# Patient Record
Sex: FEMALE
[De-identification: names, ages, dates, MRNs, and addresses within clinical notes are randomized per-mention and may not be internally consistent; named-entity substitution may affect disease eponyms.]

---

## 2022-06-11 ENCOUNTER — HOSPITAL ENCOUNTER (EMERGENCY)
Dept: HOSPITAL 75 - ER FS | Age: 67
Discharge: HOME | End: 2022-06-11
Payer: COMMERCIAL

## 2022-06-11 VITALS — BODY MASS INDEX: 27.78 KG/M2 | HEIGHT: 66.02 IN | WEIGHT: 172.84 LBS

## 2022-06-11 VITALS — DIASTOLIC BLOOD PRESSURE: 63 MMHG | SYSTOLIC BLOOD PRESSURE: 134 MMHG

## 2022-06-11 DIAGNOSIS — R00.2: Primary | ICD-10-CM

## 2022-06-11 DIAGNOSIS — I45.10: ICD-10-CM

## 2022-06-11 DIAGNOSIS — E86.0: ICD-10-CM

## 2022-06-11 DIAGNOSIS — T48.4X5A: ICD-10-CM

## 2022-06-11 LAB
ALBUMIN SERPL-MCNC: 4.4 GM/DL (ref 3.2–4.5)
ALP SERPL-CCNC: 50 U/L (ref 40–136)
ALT SERPL-CCNC: 21 U/L (ref 0–55)
BASOPHILS # BLD AUTO: 0 10^3/UL (ref 0–0.1)
BASOPHILS NFR BLD AUTO: 0 % (ref 0–10)
BILIRUB SERPL-MCNC: 0.3 MG/DL (ref 0.1–1)
BUN/CREAT SERPL: 18
CALCIUM SERPL-MCNC: 9 MG/DL (ref 8.5–10.1)
CHLORIDE SERPL-SCNC: 97 MMOL/L (ref 98–107)
CO2 SERPL-SCNC: 27 MMOL/L (ref 21–32)
CREAT SERPL-MCNC: 0.5 MG/DL (ref 0.6–1.3)
EOSINOPHIL # BLD AUTO: 0.3 10^3/UL (ref 0–0.3)
EOSINOPHIL NFR BLD AUTO: 3 % (ref 0–10)
GFR SERPLBLD BASED ON 1.73 SQ M-ARVRAT: 102 ML/MIN
GLUCOSE SERPL-MCNC: 197 MG/DL (ref 70–105)
HCT VFR BLD CALC: 37 % (ref 35–52)
HGB BLD-MCNC: 12.4 G/DL (ref 11.5–16)
LYMPHOCYTES # BLD AUTO: 2 10^3/UL (ref 1–4)
LYMPHOCYTES NFR BLD AUTO: 26 % (ref 12–44)
MAGNESIUM SERPL-MCNC: 1.6 MG/DL (ref 1.6–2.4)
MANUAL DIFFERENTIAL PERFORMED BLD QL: NO
MCH RBC QN AUTO: 29 PG (ref 25–34)
MCHC RBC AUTO-ENTMCNC: 33 G/DL (ref 32–36)
MCV RBC AUTO: 88 FL (ref 80–99)
MONOCYTES # BLD AUTO: 0.8 10^3/UL (ref 0–1)
MONOCYTES NFR BLD AUTO: 11 % (ref 0–12)
NEUTROPHILS # BLD AUTO: 4.4 10^3/UL (ref 1.8–7.8)
NEUTROPHILS NFR BLD AUTO: 59 % (ref 42–75)
PLATELET # BLD: 275 10^3/UL (ref 130–400)
PMV BLD AUTO: 9.4 FL (ref 9–12.2)
POTASSIUM SERPL-SCNC: 3.6 MMOL/L (ref 3.6–5)
PROT SERPL-MCNC: 7 GM/DL (ref 6.4–8.2)
SODIUM SERPL-SCNC: 137 MMOL/L (ref 135–145)
WBC # BLD AUTO: 7.4 10^3/UL (ref 4.3–11)

## 2022-06-11 PROCEDURE — 85025 COMPLETE CBC W/AUTO DIFF WBC: CPT

## 2022-06-11 PROCEDURE — 93041 RHYTHM ECG TRACING: CPT

## 2022-06-11 PROCEDURE — 83735 ASSAY OF MAGNESIUM: CPT

## 2022-06-11 PROCEDURE — 36415 COLL VENOUS BLD VENIPUNCTURE: CPT

## 2022-06-11 PROCEDURE — 83880 ASSAY OF NATRIURETIC PEPTIDE: CPT

## 2022-06-11 PROCEDURE — 93005 ELECTROCARDIOGRAM TRACING: CPT

## 2022-06-11 PROCEDURE — 84484 ASSAY OF TROPONIN QUANT: CPT

## 2022-06-11 PROCEDURE — 80053 COMPREHEN METABOLIC PANEL: CPT

## 2022-06-11 NOTE — ED GENERAL
General


Stated Complaint:  POSS MED REACTION


Source of Information:  Patient





History of Present Illness


Date Seen by Provider:  Jun 11, 2022


Time Seen by Provider:  19:44


Initial Comments


67-year-old female presenting with complaints of feeling flushed and having 

heart palpitations after taking a Mucinex DM.  She has taken the medicine before

without having side effects.  She has had cough and cold symptoms for over a 

week.  She did do home COVID test that was negative.  She feels like her 

symptoms are improving since she took Mucinex DM but was concerned that 

something was wrong so she came to the emergency department.  She denies any 

rash, itching, nausea, vomiting, difficulty swallowing.


Timing/Duration:  1-3 Hours


Severity:  Moderate


Modifying Factors:  worse with Medication (Taking the Mucinex DM seem to trigger

her symptoms)


Associated Systoms:  No Chest Pain; Cough; No Diaphoresis, No Fever/Chills, No 

Headaches, No Loss of Appetite, No Malaise, No Nausea/Vomiting, No Rash, No 

Seizure, No Shortness of Air, No Syncope, No Weakness





Allergies and Home Medications


Allergies


Coded Allergies:  


     aspirin (Unverified  Adverse Reaction, Unknown, 6/11/22)


     codeine (Unverified  Adverse Reaction, Unknown, 6/11/22)





Patient Home Medication List


Home Medication List Reviewed:  Yes





Review of Systems


Review of Systems


Constitutional:  No chills, No fever


EENTM:  nose congestion; No ear discharge, No hearing loss, No ear pain, No 

epistaxis


Respiratory:  cough; No hemoptysis, No phlegm


Cardiovascular:  No chest pain, No edema; palpitations


Gastrointestinal:  see HPI


Genitourinary:  No dysuria, No hematuria


Musculoskeletal:  no symptoms reported


Skin:  No rash


Psychiatric/Neurological:  Denies Headache, Denies Numbness, Denies Paresthesia


Hematologic/Lymphatic:  Denies Blood Clots


Immunological/Allergic:  pollen allergy (Has seasonal allergies that are flared 

up currently)





Past Medical-Social-Family Hx


Patient Social History


Tobacco Use?:  No


Use of E-Cig and/or Vaping dev:  No


Substance use?:  No


Alcohol Use?:  No





Seasonal Allergies


Seasonal Allergies:  Yes





Past Medical History


Surgery/Hospitalization HX:  


Hypothyroid





Physical Exam


Vital Signs





Vital Signs - First Documented








 6/11/22





 19:45


 


Temp 36.7


 


Pulse 90


 


Resp 23


 


B/P (MAP) 170/72 (104)


 


Pulse Ox 96


 


O2 Delivery Room Air





Capillary Refill :


Height, Weight, BMI


Height: '"


Weight: lbs. oz. kg;  BMI


Method:


General Appearance:  No Apparent Distress, WD/WN


HEENT:  PERRL/EOMI, TMs Normal, Normal ENT Inspection, Pharynx Normal


Neck:  Full Range of Motion, Normal Inspection, Non Tender, Supple


Respiratory:  Chest Non Tender, Lungs Clear, Normal Breath Sounds, No Accessory 

Muscle Use, No Respiratory Distress


Cardiovascular:  Regular Rate, Rhythm, Normal Peripheral Pulses


Gastrointestinal:  No Pulsatile Mass, Non Tender, Soft


Rectal:  Deferred


Extremity:  Normal Capillary Refill, Normal Inspection, Normal Range of Motion, 

No Pedal Edema


Neurologic/Psychiatric:  Alert, Oriented x3, No Motor/Sensory Deficits, CNs II-

XII Norm as Tested


Skin:  Normal Color, Warm/Dry





Progress/Results/Core Measures


Suspected Sepsis


SIRS


Temperature: 


Pulse:  


Respiratory Rate: 


 


Laboratory Tests


6/11/22 20:10: White Blood Count 7.4


Blood Pressure  / 


Mean: 


 





Laboratory Tests


6/11/22 20:10: 


Creatinine 0.50L, Platelet Count 275, Total Bilirubin 0.3








Results/Orders


Lab Results





Laboratory Tests








Test


 6/11/22


20:10 Range/Units


 


 


White Blood Count


 7.4 


 4.3-11.0


10^3/uL


 


Red Blood Count


 4.25 


 3.80-5.11


10^6/uL


 


Hemoglobin 12.4  11.5-16.0  g/dL


 


Hematocrit 37  35-52  %


 


Mean Corpuscular Volume 88  80-99  fL


 


Mean Corpuscular Hemoglobin 29  25-34  pg


 


Mean Corpuscular Hemoglobin


Concent 33 


 32-36  g/dL





 


Red Cell Distribution Width 14.8 H 10.0-14.5  %


 


Platelet Count


 275 


 130-400


10^3/uL


 


Mean Platelet Volume 9.4  9.0-12.2  fL


 


Immature Granulocyte % (Auto) 1   %


 


Neutrophils (%) (Auto) 59  42-75  %


 


Lymphocytes (%) (Auto) 26  12-44  %


 


Monocytes (%) (Auto) 11  0-12  %


 


Eosinophils (%) (Auto) 3  0-10  %


 


Basophils (%) (Auto) 0  0-10  %


 


Neutrophils # (Auto)


 4.4 


 1.8-7.8


10^3/uL


 


Lymphocytes # (Auto)


 2.0 


 1.0-4.0


10^3/uL


 


Monocytes # (Auto)


 0.8 


 0.0-1.0


10^3/uL


 


Eosinophils # (Auto)


 0.3 


 0.0-0.3


10^3/uL


 


Basophils # (Auto)


 0.0 


 0.0-0.1


10^3/uL


 


Immature Granulocyte # (Auto)


 0.0 


 0.0-0.1


10^3/uL


 


Sodium Level 137  135-145  MMOL/L


 


Potassium Level 3.6  3.6-5.0  MMOL/L


 


Chloride Level 97 L   MMOL/L


 


Carbon Dioxide Level 27  21-32  MMOL/L


 


Anion Gap 13  5-14  MMOL/L


 


Blood Urea Nitrogen 9  7-18  MG/DL


 


Creatinine


 0.50 L


 0.60-1.30


MG/DL


 


Estimat Glomerular Filtration


Rate 102 


  





 


BUN/Creatinine Ratio 18   


 


Glucose Level 197 H   MG/DL


 


Calcium Level 9.0  8.5-10.1  MG/DL


 


Corrected Calcium 8.7  8.5-10.1  MG/DL


 


Magnesium Level 1.6  1.6-2.4  MG/DL


 


Total Bilirubin 0.3  0.1-1.0  MG/DL


 


Aspartate Amino Transf


(AST/SGOT) 18 


 5-34  U/L





 


Alanine Aminotransferase


(ALT/SGPT) 21 


 0-55  U/L





 


Alkaline Phosphatase 50    U/L


 


Troponin I < 0.30  <0.30  NG/ML


 


Pro-B-Type Natriuretic Peptide 11.2  <75.0  PG/ML


 


Total Protein 7.0  6.4-8.2  GM/DL


 


Albumin 4.4  3.2-4.5  GM/DL








My Orders





Orders - PITO BARAHONA MD


Cbc With Automated Diff (6/11/22 19:57)


Magnesium (6/11/22 19:57)


Ekg Tracing (6/11/22 19:57)


Comprehensive Metabolic Panel (6/11/22 19:57)


Monitor-Rhythm Ecg Trace Only (6/11/22 19:57)


Ed Iv/Invasive Line Start (6/11/22 19:57)


Troponin I Fs (6/11/22 19:57)


Probnp Fs (6/11/22 19:57)


Ns Iv 1000 Ml (Sodium Chloride 0.9%) (6/11/22 19:57)





Vital Signs/I&O











 6/11/22





 19:45


 


Temp 36.7


 


Pulse 90


 


Resp 23


 


B/P (MAP) 170/72 (104)


 


Pulse Ox 96


 


O2 Delivery Room Air





Capillary Refill :


Progress Note #1:  


Progress Note


Patient with patient her vital signs and exam appear stable and benign.  It 

could just be a side effect of the medicine as well as may be some dehydration. 

Will check basic labs and try giving some IV fluid for hydration.  Ordered 1 L 

normal saline for hydration.


Progress Note #2:  


Progress Note


Electrocardiogram does not show any acute ectopy.  Her labs appear stable 

without acute significant abnormality.  She does have improved heart rate with 

treatment here in the ED.  Counseled on avoiding Mucinex DM or any of the 

Mucinex products that have added ingredients.  Consider using Coricidin products

instead.  Counseled on follow-up and return precautions.





ECG


Initial ECG Impression Date:  Jun 11, 2022


Initial ECG Impression Time:  20:11


Initial ECG Rate:  85


Initial ECG Rhythm:  Normal Sinus


Initial ECG Comparisson:  No Previous ECG Available


Comment


Normal sinus rhythm with a heart rate of 85 bpm.  RI interval 197 ms.  

Borderline left axis deviation.  Incomplete right bundle branch block.  No acute

ST elevation.  QT interval 371 ms with a QTc interval 413 ms.  No prior tracing 

available for comparison.





Departure


Impression





   Primary Impression:  


   Heart palpitations


   Additional Impressions:  


   Side effect of medication


   Dehydration


Disposition:  01 HOME, SELF-CARE


Condition:  Stable





Departure-Patient Inst.


Decision time for Depature:  20:58


Referrals:  


SHILPA MCFADDEN (PCP/Family)


Primary Care Physician


Patient Instructions:  Adverse Drug Reactions, Adult ED, Palpitations ED





Add. Discharge Instructions:  


Avoid taking Mucinex with the added ingredients as they could cause 

palpitations.





Consider using Coricidin if you need something for cough and cold symptoms.  

This was recommended by cardiologist to help with cough and cold symptoms with 

minimal effect on your heart





Stay well-hydrated and drink plenty of fluids and electrolyte drinks.  Check 

back with your regular provider if having continued concerns.











PITO BARAHONA MD               Jun 11, 2022 19:48